# Patient Record
(demographics unavailable — no encounter records)

---

## 2024-12-16 NOTE — HISTORY OF PRESENT ILLNESS
[FreeTextEntry1] :   JEFFERY LÓPEZ Apr 11 1965   Language: English Date of First visit: 12/09/2024 Accompanied by: self Contact info: Referring Provider/PCP: Dr. Aguilar Fax:   CC/ Problem List:  prostate evaluation =============================================================================== FIRST VISIT / Summary: Very pleasant 59 year old M here for prostate evaluation   ------------------------------------------------------------------------------------------- INTERVAL VISITS:   ===============================================================================   PMH: pancreatitis (alcohol related) HTN, COPD.  FHx: SocHx:   PSH:   ROS: Review of Systems is as per HPI unless otherwise denoted below   =============================================================================== DATA: LABS (SELECTED):--------------------------------------------------------------------------------------------------- 11/27/2024  PSA 3.1 A1c 5.3, UA bacteria neg, blood neg, leukocytes neg, nitrite neg, UA  RBC <1   RADS:------------------------------------------------------------------------------------------------------------------- 11/27/2024 Abdominal sonogram- no renal stones or hydronephrosis. Prostate approx 45 cc with a 2 cm hypoechoic central nodule within   PATHOLOGY/CYTOLOGY:-------------------------------------------------------------------------------------------     VOIDING STUDIES: ----------------------------------------------------------------------------------------------------     STONE STUDIES: (Analysis/LLSA)----------------------------------------------------------------------------------     PROCEDURES: -----------------------------------------------------------------------------------------------       =============================================================================== PHYSICAL EXAM:    FOCUSED: ----------------------------------------------------------------------------------------------------------------     ======================================================================================= DISCUSSION: ======================================================================================= ASSESSMENT and PLAN   1.   2.   3.   =======================================================================================   Thank you for allowing me to assist in the care of your patient. Should you have any questions please do not hesitate to reach out to me.     Uli Rojo MD                                                    [facility] Physician [facility]   [place] Office: [address] [facility] [address] [facility], [address] [place] [address] T: [phone] F: [phone]   [place] Office: [address] [place] [address], 1st [place] [address] T: [phone] F: [phone]

## 2024-12-16 NOTE — HISTORY OF PRESENT ILLNESS
[FreeTextEntry1] :   JEFFERY LÓPEZ Apr 11 1965   Language: English Date of First visit: 12/09/2024 Accompanied by: self Contact info: Referring Provider/PCP: Dr. Aguilar Fax:   CC/ Problem List:  prostate evaluation =============================================================================== FIRST VISIT / Summary: Very pleasant 59 year old M here for prostate evaluation   ------------------------------------------------------------------------------------------- INTERVAL VISITS:   ===============================================================================   PMH: pancreatitis (alcohol related) HTN, COPD.  FHx: SocHx:   PSH:   ROS: Review of Systems is as per HPI unless otherwise denoted below   =============================================================================== DATA: LABS (SELECTED):--------------------------------------------------------------------------------------------------- 11/27/2024  PSA 3.1 A1c 5.3, UA bacteria neg, blood neg, leukocytes neg, nitrite neg, UA  RBC <1   RADS:------------------------------------------------------------------------------------------------------------------- 11/27/2024 Abdominal sonogram- no renal stones or hydronephrosis. Prostate approx 45 cc with a 2 cm hypoechoic central nodule within   PATHOLOGY/CYTOLOGY:-------------------------------------------------------------------------------------------     VOIDING STUDIES: ----------------------------------------------------------------------------------------------------     STONE STUDIES: (Analysis/LLSA)----------------------------------------------------------------------------------     PROCEDURES: -----------------------------------------------------------------------------------------------       =============================================================================== PHYSICAL EXAM:    FOCUSED: ----------------------------------------------------------------------------------------------------------------     ======================================================================================= DISCUSSION: ======================================================================================= ASSESSMENT and PLAN   1.   2.   3.   =======================================================================================   Thank you for allowing me to assist in the care of your patient. Should you have any questions please do not hesitate to reach out to me.     Uli Rojo MD                                                    [facility] Physician [facility]   [place] Office: [address] [facility], [address] [place] [address] T: [phone] F: [phone]   [place] Office: [address] [place] [address], 1st [place] [address] T: [phone] F: [phone]

## 2024-12-16 NOTE — HISTORY OF PRESENT ILLNESS
[FreeTextEntry1] :   JEFFERY LÓPEZ Apr 11 1965   Language: English Date of First visit: 12/09/2024 Accompanied by: self Contact info: Referring Provider/PCP: Dr. Aguilar Fax:   CC/ Problem List:  prostate evaluation =============================================================================== FIRST VISIT / Summary: Very pleasant 59 year old M here for prostate evaluation   ------------------------------------------------------------------------------------------- INTERVAL VISITS:   ===============================================================================   PMH: pancreatitis (alcohol related) HTN, COPD.  FHx: SocHx:   PSH:   ROS: Review of Systems is as per HPI unless otherwise denoted below   =============================================================================== DATA: LABS (SELECTED):--------------------------------------------------------------------------------------------------- 11/27/2024  PSA 3.1 A1c 5.3, UA bacteria neg, blood neg, leukocytes neg, nitrite neg, UA  RBC <1   RADS:------------------------------------------------------------------------------------------------------------------- 11/27/2024 Abdominal sonogram- no renal stones or hydronephrosis. Prostate approx 45 cc with a 2 cm hypoechoic central nodule within   PATHOLOGY/CYTOLOGY:-------------------------------------------------------------------------------------------     VOIDING STUDIES: ----------------------------------------------------------------------------------------------------     STONE STUDIES: (Analysis/LLSA)----------------------------------------------------------------------------------     PROCEDURES: -----------------------------------------------------------------------------------------------       =============================================================================== PHYSICAL EXAM:    FOCUSED: ----------------------------------------------------------------------------------------------------------------     ======================================================================================= DISCUSSION: ======================================================================================= ASSESSMENT and PLAN   1.   2.   3.   =======================================================================================   Thank you for allowing me to assist in the care of your patient. Should you have any questions please do not hesitate to reach out to me.     Uli Rojo MD                                                    [facility] Physician [facility]   [place] Office: [address] [facility], [address] [place] [address] T: [phone] F: [phone]   [place] Office: [address] [place] [address], 1st [place] [address] T: [phone] F: [phone]